# Patient Record
Sex: MALE | Race: BLACK OR AFRICAN AMERICAN | NOT HISPANIC OR LATINO | Employment: FULL TIME | URBAN - METROPOLITAN AREA
[De-identification: names, ages, dates, MRNs, and addresses within clinical notes are randomized per-mention and may not be internally consistent; named-entity substitution may affect disease eponyms.]

---

## 2024-04-17 ENCOUNTER — HOSPITAL ENCOUNTER (EMERGENCY)
Facility: HOSPITAL | Age: 29
Discharge: HOME | End: 2024-04-17
Attending: EMERGENCY MEDICINE

## 2024-04-17 VITALS
OXYGEN SATURATION: 100 % | RESPIRATION RATE: 16 BRPM | DIASTOLIC BLOOD PRESSURE: 98 MMHG | TEMPERATURE: 97.3 F | SYSTOLIC BLOOD PRESSURE: 160 MMHG | HEART RATE: 75 BPM

## 2024-04-17 DIAGNOSIS — K08.89 PAIN, DENTAL: Primary | ICD-10-CM

## 2024-04-17 PROCEDURE — 99282 EMERGENCY DEPT VISIT SF MDM: CPT

## 2024-04-17 PROCEDURE — 99284 EMERGENCY DEPT VISIT MOD MDM: CPT | Performed by: EMERGENCY MEDICINE

## 2024-04-17 RX ORDER — NAPROXEN 375 MG/1
375 TABLET ORAL 2 TIMES DAILY WITH MEALS
Qty: 20 TABLET | Refills: 0 | Status: SHIPPED | OUTPATIENT
Start: 2024-04-17

## 2024-04-17 RX ORDER — IBUPROFEN 600 MG/1
600 TABLET ORAL ONCE
Status: COMPLETED | OUTPATIENT
Start: 2024-04-17 | End: 2024-04-17

## 2024-04-17 RX ORDER — ACETAMINOPHEN 325 MG/1
650 TABLET ORAL ONCE
Status: COMPLETED | OUTPATIENT
Start: 2024-04-17 | End: 2024-04-17

## 2024-04-17 RX ADMIN — IBUPROFEN 600 MG: 600 TABLET, FILM COATED ORAL at 10:31

## 2024-04-17 RX ADMIN — ACETAMINOPHEN 650 MG: 325 TABLET, FILM COATED ORAL at 10:31

## 2024-04-17 NOTE — ED ATTENDING ATTESTATION
4/17/2024  I, Suraj Lange DO, saw and evaluated the patient. I have discussed the patient with the resident/non-physician practitioner and agree with the resident's/non-physician practitioner's findings, Plan of Care, and MDM as documented in the resident's/non-physician practitioner's note, except where noted. All available labs and Radiology studies were reviewed.  I was present for key portions of any procedure(s) performed by the resident/non-physician practitioner and I was immediately available to provide assistance.       At this point I agree with the current assessment done in the Emergency Department.  I have conducted an independent evaluation of this patient a history and physical is as follows:    Patient is a healthy 29-year-old male who says 2 days ago he began having pain in his left lower molar area, tooth #17.  Unrelated to food or hot or cold.  He says that he is never had his wisdom teeth removed.  He has no fever, no chills, no difficulty swallowing, no shortness of breath, no swelling of the cheek.  Has been trying Tylenol and ibuprofen occasionally with some mild but not complete relief.  He moved to the area a year ago, prior to that he had routine regular dental care but does not have a dentist or oral surgeon in the area.    General:  Patient is well-appearing  Head:  Atraumatic  Eyes:  Conjunctiva pink  ENT: Patient has no facial erythema or swelling, tooth #17 is partially erupted, there is no swelling around the mandible or maxillary gumline, no swelling the floor the mouth.  No swelling posterior pharynx.  No trismus.  No oropharyngeal lesions  Neck:  Supple  Pulmonary:  Lungs clear to auscultation bilaterally  Neurologic:  Awake, fluent speech, normal comprehension  Skin:  Pink warm and dry  Psychiatric:  Alert, pleasant, cooperative      ED Course     Patient has mild dentalgia due to eruption of tooth #17, no sign of infection or abscess, do not believe imaging or labs are  indicated.Supportive care, importance of follow-up and return precautions were discussed with the patient, who expressed understanding.    DIAGNOSIS:  Acute dentalgia    MEDICAL DECISION MAKING CODING      Tests considered but not ordered: See above    RISK  Drugs (OTC, Rx, Controlled substances): Recommended continued use of over-the-counter analgesia          Critical Care Time  Procedures

## 2024-04-17 NOTE — Clinical Note
Juliocesar Lombardi was seen and treated in our emergency department on 4/17/2024.                Diagnosis:     Juliocesar  is off the rest of the shift today.    He may return on this date: 04/18/2024         If you have any questions or concerns, please don't hesitate to call.      Lucien Melissa, DO    ______________________________           _______________          _______________  Hospital Representative                              Date                                Time

## 2024-04-17 NOTE — ED PROVIDER NOTES
History  Chief Complaint   Patient presents with    Dental Problem     lower left gum line swelling  X2 days. Pt states it might be wisdom teeth, have not had them removed.     29-year-old male with no significant past medical history presents ED for evaluation of left lower molar pain and swelling x 2 days.  Patient reports he thinks his wisdom tooth is coming in, and breaking through the gum.  This has caused him pain.  The pain is not worsened after  drinking hot or cold liquids.  The pain is constant patient reports he took Motrin and Tylenol yesterday without complete relief of the pain.  Patient states he has not followed up with a dentist in 1 year.  Patient denies voice change, neck pain, drooling, sore throat, fever, chills, tongue elevation.        None       No past medical history on file.    No past surgical history on file.    No family history on file.  I have reviewed and agree with the history as documented.    E-Cigarette/Vaping    E-Cigarette Use Never User      E-Cigarette/Vaping Substances     Social History     Tobacco Use    Smoking status: Never    Smokeless tobacco: Never   Vaping Use    Vaping status: Never Used   Substance Use Topics    Drug use: Never        Review of Systems   Constitutional:  Negative for chills and fever.   HENT:  Positive for dental problem. Negative for drooling, facial swelling, sore throat, trouble swallowing and voice change.    Respiratory:  Negative for cough and shortness of breath.    Cardiovascular:  Negative for chest pain.   Gastrointestinal:  Negative for nausea and vomiting.   Musculoskeletal:  Negative for neck pain and neck stiffness.   Neurological:  Negative for headaches.       Physical Exam  ED Triage Vitals [04/17/24 0934]   Temperature Pulse Respirations Blood Pressure SpO2   (!) 97.3 °F (36.3 °C) 75 16 160/98 100 %      Temp Source Heart Rate Source Patient Position - Orthostatic VS BP Location FiO2 (%)   Tympanic -- Sitting Left arm --      Pain  Score       8             Orthostatic Vital Signs  Vitals:    04/17/24 0934   BP: 160/98   Pulse: 75   Patient Position - Orthostatic VS: Sitting       Physical Exam  Vitals and nursing note reviewed.   Constitutional:       General: He is not in acute distress.     Appearance: Normal appearance. He is normal weight. He is not ill-appearing or toxic-appearing.   HENT:      Head: Normocephalic and atraumatic.      Mouth/Throat:      Mouth: Mucous membranes are moist.      Dentition: No dental abscesses.      Pharynx: Oropharynx is clear. Uvula midline.      Tonsils: No tonsillar exudate or tonsillar abscesses.        Comments: Tenderness to palpation tooth #17 and the surrounding gum with no  erythema or swelling.  No drainage expressed or pockets of fluctuance palpated.  Tongue is midline.  Eyes:      Conjunctiva/sclera: Conjunctivae normal.   Cardiovascular:      Rate and Rhythm: Normal rate and regular rhythm.      Heart sounds: Normal heart sounds.   Pulmonary:      Effort: Pulmonary effort is normal.      Breath sounds: Normal breath sounds. No wheezing.   Musculoskeletal:      Cervical back: Full passive range of motion without pain, normal range of motion and neck supple. No rigidity, tenderness or crepitus. No pain with movement.   Lymphadenopathy:      Cervical: No cervical adenopathy.   Skin:     General: Skin is warm and dry.   Neurological:      Mental Status: He is alert and oriented to person, place, and time.         ED Medications  Medications   acetaminophen (TYLENOL) tablet 650 mg (650 mg Oral Given 4/17/24 1031)   ibuprofen (MOTRIN) tablet 600 mg (600 mg Oral Given 4/17/24 1031)       Diagnostic Studies  Results Reviewed       None                   No orders to display         Procedures  Procedures      ED Course                             SBIRT 22yo+      Flowsheet Row Most Recent Value   Initial Alcohol Screen: US AUDIT-C     1. How often do you have a drink containing alcohol? 0 Filed at:  04/17/2024 0936   2. How many drinks containing alcohol do you have on a typical day you are drinking?  0 Filed at: 04/17/2024 0936   3a. Male UNDER 65: How often do you have five or more drinks on one occasion? 0 Filed at: 04/17/2024 0936   3b. FEMALE Any Age, or MALE 65+: How often do you have 4 or more drinks on one occassion? 0 Filed at: 04/17/2024 0936   Audit-C Score 0 Filed at: 04/17/2024 0936   CECI: How many times in the past year have you...    Used an illegal drug or used a prescription medication for non-medical reasons? Never Filed at: 04/17/2024 0936                  Medical Decision Making  29-year-old male presents ED for evaluation of 2 days of right lower molar pain.  Patient has attempted Motrin and Tylenol at home for the pain, and has not followed up with a dentist in 1 year.  The pain is not worsened with hot or cold liquids.    On exam, patient has tenderness to palpation of the right lower molar with no surrounding erythema or edema.  No pockets of fluctuance.  No tongue elevation or deviation.  Neck range of motion is normal.  Neck without tenderness or crepitus.    Differential diagnoses include dental caries, I doubt pulpitis as the pain is not worse with hot or cold liquids, I doubt Lior angina based on the physical exam findings.    Plan: This patient's dentalgia will be treated with Motrin and Tylenol.  I will provide the patient with a list of dental clinics and instruct him to follow-up.  Patient was instructed to return to the emergency department if his pain becomes uncontrolled, or he has trouble swallowing or neck pain.  Patient states he understands.  Will discharge to home    Risk  OTC drugs.  Prescription drug management.          Disposition  Final diagnoses:   Pain, dental     Time reflects when diagnosis was documented in both MDM as applicable and the Disposition within this note       Time User Action Codes Description Comment    4/17/2024 10:13 AM Lucien Melissa  [K08.89] Pain, dental           ED Disposition       ED Disposition   Discharge    Condition   Stable    Date/Time   Wed Apr 17, 2024 1013    Comment   Juliocesar Lombardi discharge to home/self care.                   Follow-up Information       Follow up With Specialties Details Why Contact Info    [facility] [place] [address]  [phone]            Discharge Medication List as of 4/17/2024 10:18 AM        START taking these medications    Details   naproxen (NAPROSYN) 375 mg tablet Take 1 tablet (375 mg total) by mouth 2 (two) times a day with meals, Starting Wed 4/17/2024, Normal           No discharge procedures on file.    PDMP Review       None             ED Provider  Attending physically available and evaluated Juliocesar Lombardi. I managed the patient along with the ED Attending.    Electronically Signed by           Lucien Melissa DO  04/17/24 9206

## 2024-04-17 NOTE — DISCHARGE INSTRUCTIONS
You were evaluated in our emergency department for dental pain.  For wisdom teeth, this is a problem that ultimately needs to be evaluated by oral surgeon.  We have provided you with a referral for 1.  For your pain, you can take either Motrin and Tylenol, or we have provided you a prescription for naproxen.  You can also buy Orajel over-the-counter, which is a tooth numbing medication that you can rub on the gum for the tooth.